# Patient Record
Sex: MALE | Race: WHITE | Employment: UNEMPLOYED | URBAN - METROPOLITAN AREA
[De-identification: names, ages, dates, MRNs, and addresses within clinical notes are randomized per-mention and may not be internally consistent; named-entity substitution may affect disease eponyms.]

---

## 2017-07-22 ENCOUNTER — APPOINTMENT (OUTPATIENT)
Dept: GENERAL RADIOLOGY | Age: 47
End: 2017-07-22
Attending: EMERGENCY MEDICINE
Payer: SELF-PAY

## 2017-07-22 ENCOUNTER — APPOINTMENT (OUTPATIENT)
Dept: GENERAL RADIOLOGY | Age: 47
End: 2017-07-22
Attending: NURSE PRACTITIONER
Payer: SELF-PAY

## 2017-07-22 ENCOUNTER — APPOINTMENT (OUTPATIENT)
Dept: CT IMAGING | Age: 47
End: 2017-07-22
Attending: NURSE PRACTITIONER
Payer: SELF-PAY

## 2017-07-22 ENCOUNTER — HOSPITAL ENCOUNTER (EMERGENCY)
Age: 47
Discharge: HOME | End: 2017-07-22
Attending: EMERGENCY MEDICINE
Payer: SELF-PAY

## 2017-07-22 VITALS
RESPIRATION RATE: 14 BRPM | HEART RATE: 56 BPM | HEIGHT: 69 IN | DIASTOLIC BLOOD PRESSURE: 65 MMHG | WEIGHT: 135 LBS | TEMPERATURE: 98.1 F | BODY MASS INDEX: 19.99 KG/M2 | OXYGEN SATURATION: 100 % | SYSTOLIC BLOOD PRESSURE: 123 MMHG
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
## 2017-07-22 DIAGNOSIS — S49.91XA RIGHT SHOULDER INJURY, INITIAL ENCOUNTER: ICD-10-CM

## 2017-07-22 DIAGNOSIS — M79.601 RIGHT ARM PAIN: ICD-10-CM

## 2017-07-22 DIAGNOSIS — S20.211A RIB CONTUSION, RIGHT, INITIAL ENCOUNTER: ICD-10-CM

## 2017-07-22 LAB
ANION GAP BLD CALC-SCNC: 6 MMOL/L (ref 3–18)
APTT PPP: 26.9 SEC (ref 23–36.4)
BASOPHILS # BLD AUTO: 0 K/UL (ref 0–0.06)
BASOPHILS # BLD: 0 % (ref 0–2)
BUN SERPL-MCNC: 11 MG/DL (ref 7–18)
BUN/CREAT SERPL: 13 (ref 12–20)
CALCIUM SERPL-MCNC: 9.5 MG/DL (ref 8.5–10.1)
CHLORIDE SERPL-SCNC: 105 MMOL/L (ref 100–108)
CO2 SERPL-SCNC: 31 MMOL/L (ref 21–32)
CREAT SERPL-MCNC: 0.84 MG/DL (ref 0.6–1.3)
D DIMER PPP FEU-MCNC: 0.73 UG/ML(FEU)
DIFFERENTIAL METHOD BLD: ABNORMAL
EOSINOPHIL # BLD: 0.2 K/UL (ref 0–0.4)
EOSINOPHIL NFR BLD: 3 % (ref 0–5)
ERYTHROCYTE [DISTWIDTH] IN BLOOD BY AUTOMATED COUNT: 13.7 % (ref 11.6–14.5)
GLUCOSE SERPL-MCNC: 103 MG/DL (ref 74–99)
HCT VFR BLD AUTO: 44.6 % (ref 36–48)
HGB BLD-MCNC: 14.8 G/DL (ref 13–16)
INR PPP: 0.9 (ref 0.8–1.2)
LYMPHOCYTES # BLD AUTO: 26 % (ref 21–52)
LYMPHOCYTES # BLD: 1.8 K/UL (ref 0.9–3.6)
MCH RBC QN AUTO: 29.7 PG (ref 24–34)
MCHC RBC AUTO-ENTMCNC: 33.2 G/DL (ref 31–37)
MCV RBC AUTO: 89.4 FL (ref 74–97)
MONOCYTES # BLD: 0.7 K/UL (ref 0.05–1.2)
MONOCYTES NFR BLD AUTO: 11 % (ref 3–10)
NEUTS SEG # BLD: 4.1 K/UL (ref 1.8–8)
NEUTS SEG NFR BLD AUTO: 60 % (ref 40–73)
PLATELET # BLD AUTO: 186 K/UL (ref 135–420)
PMV BLD AUTO: 10.6 FL (ref 9.2–11.8)
POTASSIUM SERPL-SCNC: 4.2 MMOL/L (ref 3.5–5.5)
PROTHROMBIN TIME: 11.6 SEC (ref 11.5–15.2)
RBC # BLD AUTO: 4.99 M/UL (ref 4.7–5.5)
SODIUM SERPL-SCNC: 142 MMOL/L (ref 136–145)
WBC # BLD AUTO: 6.9 K/UL (ref 4.6–13.2)

## 2017-07-22 PROCEDURE — 74011250637 HC RX REV CODE- 250/637: Performed by: NURSE PRACTITIONER

## 2017-07-22 PROCEDURE — 73030 X-RAY EXAM OF SHOULDER: CPT

## 2017-07-22 PROCEDURE — 96374 THER/PROPH/DIAG INJ IV PUSH: CPT

## 2017-07-22 PROCEDURE — 85610 PROTHROMBIN TIME: CPT | Performed by: EMERGENCY MEDICINE

## 2017-07-22 PROCEDURE — 85730 THROMBOPLASTIN TIME PARTIAL: CPT | Performed by: EMERGENCY MEDICINE

## 2017-07-22 PROCEDURE — 80048 BASIC METABOLIC PNL TOTAL CA: CPT | Performed by: NURSE PRACTITIONER

## 2017-07-22 PROCEDURE — 73060 X-RAY EXAM OF HUMERUS: CPT

## 2017-07-22 PROCEDURE — 71275 CT ANGIOGRAPHY CHEST: CPT

## 2017-07-22 PROCEDURE — 74011250636 HC RX REV CODE- 250/636: Performed by: NURSE PRACTITIONER

## 2017-07-22 PROCEDURE — 74011636320 HC RX REV CODE- 636/320: Performed by: EMERGENCY MEDICINE

## 2017-07-22 PROCEDURE — 99284 EMERGENCY DEPT VISIT MOD MDM: CPT

## 2017-07-22 PROCEDURE — 71100 X-RAY EXAM RIBS UNI 2 VIEWS: CPT

## 2017-07-22 PROCEDURE — 77030027138 HC INCENT SPIROMETER -A

## 2017-07-22 PROCEDURE — 85379 FIBRIN DEGRADATION QUANT: CPT | Performed by: NURSE PRACTITIONER

## 2017-07-22 PROCEDURE — 85025 COMPLETE CBC W/AUTO DIFF WBC: CPT | Performed by: EMERGENCY MEDICINE

## 2017-07-22 PROCEDURE — 74011000258 HC RX REV CODE- 258: Performed by: EMERGENCY MEDICINE

## 2017-07-22 PROCEDURE — A4565 SLINGS: HCPCS

## 2017-07-22 RX ORDER — KETOROLAC TROMETHAMINE 30 MG/ML
30 INJECTION, SOLUTION INTRAMUSCULAR; INTRAVENOUS
Status: COMPLETED | OUTPATIENT
Start: 2017-07-22 | End: 2017-07-22

## 2017-07-22 RX ORDER — OXYCODONE AND ACETAMINOPHEN 5; 325 MG/1; MG/1
1 TABLET ORAL
Qty: 14 TAB | Refills: 0 | Status: SHIPPED | OUTPATIENT
Start: 2017-07-22 | End: 2020-08-17

## 2017-07-22 RX ORDER — SODIUM CHLORIDE 9 MG/ML
97 INJECTION, SOLUTION INTRAVENOUS ONCE
Status: COMPLETED | OUTPATIENT
Start: 2017-07-22 | End: 2017-07-22

## 2017-07-22 RX ORDER — IBUPROFEN 600 MG/1
600 TABLET ORAL
Qty: 20 TAB | Refills: 0 | Status: SHIPPED | OUTPATIENT
Start: 2017-07-22 | End: 2020-08-17

## 2017-07-22 RX ORDER — OXYCODONE AND ACETAMINOPHEN 5; 325 MG/1; MG/1
1 TABLET ORAL
Status: COMPLETED | OUTPATIENT
Start: 2017-07-22 | End: 2017-07-22

## 2017-07-22 RX ADMIN — KETOROLAC TROMETHAMINE 30 MG: 30 INJECTION, SOLUTION INTRAMUSCULAR at 13:38

## 2017-07-22 RX ADMIN — OXYCODONE HYDROCHLORIDE AND ACETAMINOPHEN 1 TABLET: 5; 325 TABLET ORAL at 10:36

## 2017-07-22 RX ADMIN — IOPAMIDOL 75 ML: 755 INJECTION, SOLUTION INTRAVENOUS at 12:55

## 2017-07-22 RX ADMIN — SODIUM CHLORIDE 97 ML: 900 INJECTION, SOLUTION INTRAVENOUS at 12:55
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
## 2017-07-22 NOTE — DISCHARGE INSTRUCTIONS
Chest Contusion: Care Instructions  Your Care Instructions  A chest contusion, or bruise, is caused by a fall or direct blow to the chest. Car crashes, falls, getting punched, and injury from bicycle handlebars are common causes of chest contusions. A very forceful blow to the chest can injure the heart or blood vessels in the chest, the lungs, the airway, the liver, or the spleen. Pain may be caused by an injury to muscles, cartilage, or ribs. Deep breathing, coughing, or sneezing can increase your pain. Lying on the injured area also can cause pain. Follow-up care is a key part of your treatment and safety. Be sure to make and go to all appointments, and call your doctor if you are having problems. It's also a good idea to know your test results and keep a list of the medicines you take. How can you care for yourself at home? · Rest and protect the injured or sore area. Stop, change, or take a break from any activity that may be causing your pain. · Put ice or a cold pack on the area for 10 to 20 minutes at a time. Put a thin cloth between the ice and your skin. · After 2 or 3 days, if your swelling is gone, apply a heating pad set on low or a warm cloth to your chest. Some doctors suggest that you go back and forth between hot and cold. Put a thin cloth between the heating pad and your skin. · Do not wrap or tape your ribs for support. This may cause you to take smaller breaths, which could increase your risk of pneumonia and lung collapse. · Ask your doctor if you can take an over-the-counter pain medicine, such as acetaminophen (Tylenol), ibuprofen (Advil, Motrin), or naproxen (Aleve). Be safe with medicines. Read and follow all instructions on the label. · Take your medicines exactly as prescribed. Call your doctor if you think you are having a problem with your medicine.   · Gentle stretching and massage may help you feel better after a few days of rest. Stretch slowly to the point just before discomfort begins, then hold the stretch for at least 15 to 30 seconds. Do this 3 or 4 times per day. · As your pain gets better, slowly return to your normal activities. Be patient, because chest bruises can take weeks or months to heal. Any increased pain may be a sign that you need to rest a while longer. When should you call for help? Call 911 anytime you think you may need emergency care. For example, call if:  · You have severe trouble breathing. · You cough up blood. Call your doctor now or seek immediate medical care if:  · You have belly pain. · You are dizzy or lightheaded, or you feel like you may faint. · You develop new symptoms with the chest pain. · Your chest pain gets worse. · You have a fever. · You have some shortness of breath. · You have a cough that brings up mucus from the lungs. Watch closely for changes in your health, and be sure to contact your doctor if:  · Your chest pain is not improving after 1 week. Where can you learn more? Go to http://jessica-marlena.info/. Enter I174 in the search box to learn more about \"Chest Contusion: Care Instructions. \"  Current as of: March 20, 2017  Content Version: 11.3  © 1454-1889 [employer]. Care instructions adapted under license by [employer] (which disclaims liability or warranty for this information). If you have questions about a medical condition or this instruction, always ask your healthcare professional. [place] [address] any warranty or liability for your use of this information.

## 2017-07-22 NOTE — ED NOTES
Placed right arm in a sling. Adjusted to proper length and checked pulses. Instructed patient on incentive spirometer and got a return demonstration.
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
## 2020-08-17 ENCOUNTER — APPOINTMENT (OUTPATIENT)
Dept: GENERAL RADIOLOGY | Age: 50
End: 2020-08-17
Attending: EMERGENCY MEDICINE

## 2020-08-17 ENCOUNTER — APPOINTMENT (OUTPATIENT)
Dept: CT IMAGING | Age: 50
End: 2020-08-17
Attending: EMERGENCY MEDICINE

## 2020-08-17 ENCOUNTER — HOSPITAL ENCOUNTER (EMERGENCY)
Age: 50
Discharge: HOME | End: 2020-08-17
Attending: EMERGENCY MEDICINE | Admitting: EMERGENCY MEDICINE

## 2020-08-17 VITALS
SYSTOLIC BLOOD PRESSURE: 136 MMHG | HEART RATE: 81 BPM | HEIGHT: 69 IN | OXYGEN SATURATION: 98 % | BODY MASS INDEX: 19.99 KG/M2 | TEMPERATURE: 98.2 F | WEIGHT: 135 LBS | RESPIRATION RATE: 16 BRPM | DIASTOLIC BLOOD PRESSURE: 91 MMHG

## 2020-08-17 DIAGNOSIS — S42.022A CLOSED DISPLACED FRACTURE OF SHAFT OF LEFT CLAVICLE, INITIAL ENCOUNTER: Primary | ICD-10-CM

## 2020-08-17 PROCEDURE — 73130 X-RAY EXAM OF HAND: CPT

## 2020-08-17 PROCEDURE — 96372 THER/PROPH/DIAG INJ SC/IM: CPT

## 2020-08-17 PROCEDURE — 99283 EMERGENCY DEPT VISIT LOW MDM: CPT

## 2020-08-17 PROCEDURE — 70450 CT HEAD/BRAIN W/O DYE: CPT

## 2020-08-17 PROCEDURE — 73000 X-RAY EXAM OF COLLAR BONE: CPT

## 2020-08-17 PROCEDURE — 73030 X-RAY EXAM OF SHOULDER: CPT

## 2020-08-17 PROCEDURE — 72125 CT NECK SPINE W/O DYE: CPT

## 2020-08-17 PROCEDURE — 74011250636 HC RX REV CODE- 250/636: Performed by: EMERGENCY MEDICINE

## 2020-08-17 RX ORDER — KETOROLAC TROMETHAMINE 30 MG/ML
30 INJECTION, SOLUTION INTRAMUSCULAR; INTRAVENOUS
Status: COMPLETED | OUTPATIENT
Start: 2020-08-17 | End: 2020-08-17

## 2020-08-17 RX ORDER — KETOROLAC TROMETHAMINE 10 MG/1
10 TABLET, FILM COATED ORAL
Qty: 15 TAB | Refills: 0 | Status: SHIPPED | OUTPATIENT
Start: 2020-08-17 | End: 2020-08-22

## 2020-08-17 RX ADMIN — KETOROLAC TROMETHAMINE 30 MG: 30 INJECTION, SOLUTION INTRAMUSCULAR at 11:05
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
## 2020-08-17 NOTE — LETTER
NOTIFICATION RETURN TO WORK / SCHOOL 
 
8/17/2020 11:07 AM 
 
Mr. Jose Novoa [address] [facility] [phone] [id number] To Whom It May Concern: 
 
Jose Novoa is currently under the care of [facility] EMERGENCY DEPT. He cannot work for at least 3 days. Jose Novoa may return to work/school with the following restrictions: No weightbearing of the Left arm. May wear sling at work. If there are questions or concerns please have the patient contact our office.  
 
 
 
Sincerely, 
 
 
Hima Aleman, DO

## 2020-08-17 NOTE — DISCHARGE INSTRUCTIONS
Patient Education   1. It is important that you follow-up with [facility] this afternoon. Keep your arm in the sling until you are seen by orthopedic surgery today. It has walk-in hours at 1pm today. 2.  Use the Toradol as needed for pain. If you develop any cuts in the skin overlying the area of pain you are to return to the emergency room immediately. Likewise develop any new areas of pain or any additional concerns return to the emergency department. Broken Collarbone: Care Instructions  Your Care Instructions     You have broken or cracked your collarbone, or clavicle. The collarbone is the long, slightly curved bone that connects the shoulder to the chest. It supports the shoulder. A broken collarbone may take 6 weeks or longer to heal. You will need to wear an arm sling to keep the broken bone from moving while it heals. At first, it may hurt to move your arm. This will get better with time. You heal best when you take good care of yourself. Eat a variety of healthy foods, and don't smoke. Follow-up care is a key part of your treatment and safety. Be sure to make and go to all appointments, and call your doctor if you are having problems. It's also a good idea to know your test results and keep a list of the medicines you take. How can you care for yourself at home? · Wear the sling day and night for as long as your doctor tells you to. You may take off the sling when you bathe. When the sling is off, avoid arm positions or motions that cause or increase pain. · Put ice or a cold pack on your collarbone for 10 to 20 minutes at a time. Try to do this every 1 to 2 hours for the next 3 days (when you are awake) or until the swelling goes down. Put a thin cloth between the ice and your skin. · Be safe with medicines. Take pain medicines exactly as directed. ? If the doctor gave you a prescription medicine for pain, take it as prescribed.   ? If you are not taking a prescription pain medicine, ask your doctor if you can take an over-the-counter medicine. ? Do not take two or more pain medicines at the same time unless the doctor told you to. Many pain medicines have acetaminophen, which is Tylenol. Too much acetaminophen (Tylenol) can be harmful. · Try sleeping with pillows propped under your arm for comfort. · After a few days, put your fingers, wrist, and elbow through their full range of motion several times a day. This will keep them from getting stiff. You may get instructions on rehabilitation exercises you can do when your shoulder starts to heal.  · You may use warm packs after the first 3 days for 15 to 20 minutes at a time to ease pain. · You may notice a bump where the collarbone is broken. Over time, the bump will get smaller. A small bump may remain, but it should not affect your arm's strength or movement. When should you call for help? Call your doctor now or seek immediate medical care if:  · Your fingers become numb, tingly, cool, or pale. · You cannot move your arm. Watch closely for changes in your health, and be sure to contact your doctor if:  · You have new or increased pain. · You have new or increased swelling. · You do not get better as expected. Where can you learn more? Go to http://jessica-marlena.info/  Enter P186 in the search box to learn more about \"Broken Collarbone: Care Instructions. \"  Current as of: March 2, 2020               Content Version: 12.5  © 3223-0680 [employer]. Care instructions adapted under license by [employer] (which disclaims liability or warranty for this information). If you have questions about a medical condition or this instruction, always ask your healthcare professional. [place] [address] any warranty or liability for your use of this information.

## 2020-08-17 NOTE — PROGRESS NOTES
Chart reviewed. Self Pay. Referral to [facility] for assistance with Medicaid. George Souza information provided on follow-up.     Ernie Lujan RN    Outcomes Manager  [phone]  [phone]